# Patient Record
Sex: MALE | Race: WHITE | NOT HISPANIC OR LATINO | Employment: FULL TIME | URBAN - METROPOLITAN AREA
[De-identification: names, ages, dates, MRNs, and addresses within clinical notes are randomized per-mention and may not be internally consistent; named-entity substitution may affect disease eponyms.]

---

## 2019-07-16 ENCOUNTER — TELEPHONE (OUTPATIENT)
Dept: ORTHOPEDICS | Facility: CLINIC | Age: 59
End: 2019-07-16

## 2019-08-08 DIAGNOSIS — M79.642 PAIN OF LEFT HAND: Primary | ICD-10-CM

## 2019-08-16 ENCOUNTER — HOSPITAL ENCOUNTER (OUTPATIENT)
Dept: RADIOLOGY | Facility: HOSPITAL | Age: 59
Discharge: HOME | End: 2019-08-16
Attending: ORTHOPAEDIC SURGERY
Payer: COMMERCIAL

## 2019-08-16 ENCOUNTER — OFFICE VISIT (OUTPATIENT)
Dept: ORTHOPEDICS | Facility: CLINIC | Age: 59
End: 2019-08-16
Payer: COMMERCIAL

## 2019-08-16 VITALS — HEIGHT: 69 IN | BODY MASS INDEX: 28.14 KG/M2 | WEIGHT: 190 LBS

## 2019-08-16 DIAGNOSIS — M79.642 PAIN OF LEFT HAND: ICD-10-CM

## 2019-08-16 DIAGNOSIS — L72.0 EPIDERMOID CYST OF FINGER: Primary | ICD-10-CM

## 2019-08-16 DIAGNOSIS — L72.0 EPIDERMOID CYST OF FINGER OF LEFT HAND: Primary | ICD-10-CM

## 2019-08-16 PROCEDURE — 73130 XR HAND COMPLETE 3 VIEW LEFT: ICD-10-PCS | Mod: 26,LT,, | Performed by: RADIOLOGY

## 2019-08-16 PROCEDURE — 99203 OFFICE O/P NEW LOW 30 MIN: CPT | Mod: S$PBB,,, | Performed by: ORTHOPAEDIC SURGERY

## 2019-08-16 PROCEDURE — 73130 X-RAY EXAM OF HAND: CPT | Mod: 26,LT,, | Performed by: RADIOLOGY

## 2019-08-16 PROCEDURE — 73130 X-RAY EXAM OF HAND: CPT | Mod: TC,LT

## 2019-08-16 PROCEDURE — 99203 PR OFFICE/OUTPT VISIT, NEW, LEVL III, 30-44 MIN: ICD-10-PCS | Mod: S$PBB,,, | Performed by: ORTHOPAEDIC SURGERY

## 2019-08-16 PROCEDURE — 99212 OFFICE O/P EST SF 10 MIN: CPT | Mod: PBBFAC,25 | Performed by: ORTHOPAEDIC SURGERY

## 2019-08-16 PROCEDURE — 99999 PR PBB SHADOW E&M-EST. PATIENT-LVL II: CPT | Mod: PBBFAC,,, | Performed by: ORTHOPAEDIC SURGERY

## 2019-08-16 PROCEDURE — 99999 PR PBB SHADOW E&M-EST. PATIENT-LVL II: ICD-10-PCS | Mod: PBBFAC,,, | Performed by: ORTHOPAEDIC SURGERY

## 2019-08-16 RX ORDER — MULTIVITAMIN
1 TABLET ORAL DAILY
COMMUNITY

## 2019-08-16 NOTE — H&P (VIEW-ONLY)
Subjective:     Patient ID: Jabari Byrd is a 58 y.o. male.    Chief Complaint: Pain of the Left Hand    This 43-year-old male with a several month history of fall on left index finger soft tissue mass increased in size about the radial aspect of the PIP joint. He wishes to have it removed.      History reviewed. No pertinent past medical history.  Past Surgical History:   Procedure Laterality Date    CORRECTION OF HAMMER TOE      DENTAL SURGERY      EYE SURGERY      SINUS SURGERY       Family History   Problem Relation Age of Onset    Diabetes Mother      Social History     Socioeconomic History    Marital status:      Spouse name: Not on file    Number of children: Not on file    Years of education: Not on file    Highest education level: Not on file   Occupational History    Not on file   Social Needs    Financial resource strain: Not on file    Food insecurity:     Worry: Not on file     Inability: Not on file    Transportation needs:     Medical: Not on file     Non-medical: Not on file   Tobacco Use    Smoking status: Never Smoker   Substance and Sexual Activity    Alcohol use: Not on file    Drug use: Not on file    Sexual activity: Not on file   Lifestyle    Physical activity:     Days per week: Not on file     Minutes per session: Not on file    Stress: Not on file   Relationships    Social connections:     Talks on phone: Not on file     Gets together: Not on file     Attends Taoism service: Not on file     Active member of club or organization: Not on file     Attends meetings of clubs or organizations: Not on file     Relationship status: Not on file   Other Topics Concern    Not on file   Social History Narrative    Not on file     Medication List with Changes/Refills   Current Medications    MULTIVITAMIN (ONE DAILY MULTIVITAMIN) PER TABLET    Take 1 tablet by mouth once daily.     Review of patient's allergies indicates:   Allergen Reactions    Penicillins  Swelling     Review of Systems   Constitution: Negative for chills and decreased appetite.   HENT: Positive for tinnitus. Negative for hearing loss.    Eyes: Negative for double vision and visual disturbance.   Cardiovascular: Negative for chest pain.   Endocrine: Negative for cold intolerance.   Hematologic/Lymphatic: Does not bruise/bleed easily.   Skin: Negative for poor wound healing and suspicious lesions.   Musculoskeletal: Negative for gout, joint pain and joint swelling.   Gastrointestinal: Negative for nausea and vomiting.   Genitourinary: Negative for dysuria.   Neurological: Negative for numbness, paresthesias, seizures and sensory change.   Psychiatric/Behavioral: Negative for depression, memory loss, substance abuse, suicidal ideas and thoughts of violence.   Allergic/Immunologic: Negative for HIV exposure and persistent infections.       Objective:   Body mass index is 28.06 kg/m².  There were no vitals filed for this visit.             General    Constitutional: He is oriented to person, place, and time. He appears well-developed and well-nourished. No distress.   HENT:   Head: Normocephalic.   Mouth/Throat: Oropharynx is clear and moist.   Eyes: EOM are normal. Pupils are equal, round, and reactive to light.   Neck: Normal range of motion.   Cardiovascular: Normal rate, regular rhythm and normal heart sounds.  Exam reveals no gallop.    Pulmonary/Chest: Breath sounds normal.   Abdominal: Soft. He exhibits no mass. There is no tenderness.   Neurological: He is alert and oriented to person, place, and time. No cranial nerve deficit.   Psychiatric: He has a normal mood and affect.         Left Hand/Wrist Exam     Swelling   Hand - The patient is swollen on the index IP.    Other     Sensory Exam  Median Distribution: normal  Ulnar Distribution: normal  Radial Distribution: normal    Comments:  The patient is a 5 mm soft tissue mass radial aspect PIP joint left index finger.  There is a full range of  motion. It appears to be an epidermal inclusion cyst.          Vascular Exam       Capillary Refill  Left Hand: normal capillary refill      Relevant imaging results reviewed and interpreted by me, discussed with the patient and / or family today radiographs left index finger were normal.  MRI scan left index finger confirmed epidermal inclusion cyst  Assessment:     Encounter Diagnosis   Name Primary?    Epidermoid cyst of finger of left hand Yes        Plan:         The patient wishes to have this mass excised. He was counseled regarding that surgery. Risk complications and alternatives were discussed including the risk of infection, anesthetic risk, injury to nerves and vessels, loss of motion possible recurrence were discussed. He seems to understand and agree that surgery. All questions were answered.            Disclaimer: This note was prepared using a voice recognition system and is likely to have sound alike errors within the text.

## 2019-08-16 NOTE — PROGRESS NOTES
Preop instructions reviewed and printed copy given to pt. C-Hex sponges given. PAT appt made 8-21-19 and postop appt made 9-4-19. Pt YAMEL

## 2019-08-16 NOTE — PROGRESS NOTES
Subjective:     Patient ID: Jabari Byrd is a 58 y.o. male.    Chief Complaint: Pain of the Left Hand    This 43-year-old male with a several month history of fall on left index finger soft tissue mass increased in size about the radial aspect of the PIP joint. He wishes to have it removed.      History reviewed. No pertinent past medical history.  Past Surgical History:   Procedure Laterality Date    CORRECTION OF HAMMER TOE      DENTAL SURGERY      EYE SURGERY      SINUS SURGERY       Family History   Problem Relation Age of Onset    Diabetes Mother      Social History     Socioeconomic History    Marital status:      Spouse name: Not on file    Number of children: Not on file    Years of education: Not on file    Highest education level: Not on file   Occupational History    Not on file   Social Needs    Financial resource strain: Not on file    Food insecurity:     Worry: Not on file     Inability: Not on file    Transportation needs:     Medical: Not on file     Non-medical: Not on file   Tobacco Use    Smoking status: Never Smoker   Substance and Sexual Activity    Alcohol use: Not on file    Drug use: Not on file    Sexual activity: Not on file   Lifestyle    Physical activity:     Days per week: Not on file     Minutes per session: Not on file    Stress: Not on file   Relationships    Social connections:     Talks on phone: Not on file     Gets together: Not on file     Attends Mosque service: Not on file     Active member of club or organization: Not on file     Attends meetings of clubs or organizations: Not on file     Relationship status: Not on file   Other Topics Concern    Not on file   Social History Narrative    Not on file     Medication List with Changes/Refills   Current Medications    MULTIVITAMIN (ONE DAILY MULTIVITAMIN) PER TABLET    Take 1 tablet by mouth once daily.     Review of patient's allergies indicates:   Allergen Reactions    Penicillins  Swelling     Review of Systems   Constitution: Negative for chills and decreased appetite.   HENT: Positive for tinnitus. Negative for hearing loss.    Eyes: Negative for double vision and visual disturbance.   Cardiovascular: Negative for chest pain.   Endocrine: Negative for cold intolerance.   Hematologic/Lymphatic: Does not bruise/bleed easily.   Skin: Negative for poor wound healing and suspicious lesions.   Musculoskeletal: Negative for gout, joint pain and joint swelling.   Gastrointestinal: Negative for nausea and vomiting.   Genitourinary: Negative for dysuria.   Neurological: Negative for numbness, paresthesias, seizures and sensory change.   Psychiatric/Behavioral: Negative for depression, memory loss, substance abuse, suicidal ideas and thoughts of violence.   Allergic/Immunologic: Negative for HIV exposure and persistent infections.       Objective:   Body mass index is 28.06 kg/m².  There were no vitals filed for this visit.             General    Constitutional: He is oriented to person, place, and time. He appears well-developed and well-nourished. No distress.   HENT:   Head: Normocephalic.   Mouth/Throat: Oropharynx is clear and moist.   Eyes: EOM are normal. Pupils are equal, round, and reactive to light.   Neck: Normal range of motion.   Cardiovascular: Normal rate, regular rhythm and normal heart sounds.  Exam reveals no gallop.    Pulmonary/Chest: Breath sounds normal.   Abdominal: Soft. He exhibits no mass. There is no tenderness.   Neurological: He is alert and oriented to person, place, and time. No cranial nerve deficit.   Psychiatric: He has a normal mood and affect.         Left Hand/Wrist Exam     Swelling   Hand - The patient is swollen on the index IP.    Other     Sensory Exam  Median Distribution: normal  Ulnar Distribution: normal  Radial Distribution: normal    Comments:  The patient is a 5 mm soft tissue mass radial aspect PIP joint left index finger.  There is a full range of  motion. It appears to be an epidermal inclusion cyst.          Vascular Exam       Capillary Refill  Left Hand: normal capillary refill      Relevant imaging results reviewed and interpreted by me, discussed with the patient and / or family today radiographs left index finger were normal.  MRI scan left index finger confirmed epidermal inclusion cyst  Assessment:     Encounter Diagnosis   Name Primary?    Epidermoid cyst of finger of left hand Yes        Plan:         The patient wishes to have this mass excised. He was counseled regarding that surgery. Risk complications and alternatives were discussed including the risk of infection, anesthetic risk, injury to nerves and vessels, loss of motion possible recurrence were discussed. He seems to understand and agree that surgery. All questions were answered.            Disclaimer: This note was prepared using a voice recognition system and is likely to have sound alike errors within the text.

## 2019-08-23 ENCOUNTER — HOSPITAL ENCOUNTER (OUTPATIENT)
Dept: PREADMISSION TESTING | Facility: HOSPITAL | Age: 59
Discharge: HOME | End: 2019-08-23
Attending: ORTHOPAEDIC SURGERY
Payer: COMMERCIAL

## 2019-08-23 ENCOUNTER — TELEPHONE (OUTPATIENT)
Dept: ORTHOPEDICS | Facility: CLINIC | Age: 59
End: 2019-08-23

## 2019-08-23 VITALS
DIASTOLIC BLOOD PRESSURE: 87 MMHG | SYSTOLIC BLOOD PRESSURE: 145 MMHG | TEMPERATURE: 99 F | RESPIRATION RATE: 18 BRPM | HEART RATE: 77 BPM

## 2019-08-23 VITALS
TEMPERATURE: 99 F | DIASTOLIC BLOOD PRESSURE: 87 MMHG | HEART RATE: 77 BPM | RESPIRATION RATE: 18 BRPM | SYSTOLIC BLOOD PRESSURE: 145 MMHG

## 2019-08-23 DIAGNOSIS — L72.0 EPIDERMOID CYST OF FINGER OF LEFT HAND: ICD-10-CM

## 2019-08-23 DIAGNOSIS — Z78.9 DAILY CONSUMPTION OF ALCOHOL: ICD-10-CM

## 2019-08-23 DIAGNOSIS — Z01.818 PREOP TESTING: Primary | ICD-10-CM

## 2019-08-23 PROCEDURE — 93005 ELECTROCARDIOGRAM TRACING: CPT

## 2019-08-23 PROCEDURE — 93010 EKG 12-LEAD: ICD-10-PCS | Mod: ,,, | Performed by: INTERNAL MEDICINE

## 2019-08-23 PROCEDURE — 93010 ELECTROCARDIOGRAM REPORT: CPT | Mod: ,,, | Performed by: INTERNAL MEDICINE

## 2019-08-23 NOTE — DISCHARGE INSTRUCTIONS
To confirm, Your doctor has instructed you that surgery is scheduled for 08/29/2019.       Please report to [facility].  Pre admit office will call afternoon prior to surgery with final arrival time    INSTRUCTIONS IMPORTANT!!!   Do not eat, drink, or smoke after 12 midnight-including water. OK to brush teeth, no gum, candy or mints!    ¨ Take only these medicines with a small swallow of water-morning of surgery.  N/A    Pre operative instructions:  Please review the Pre-Operative Instruction booklet that you were given.        Bathing Instructions--See page 6 in the Pre-operative booklet.      Prevention of surgical site infections:     -Keep incisions clean and dry.   -Do not soak/submerge incisions in water until completely healed.   -Do not apply lotions, powders, creams, or deodorants to site.   -Always make sure hands are cleaned with antibacterial soap/ alcohol-based   prior to touching the surgical site.  (This includes doctors, nurses, staff, and yourself.)    Signs and symptoms:   -Redness and pain around the area where you had surgery   -Drainage of cloudy fluid from your surgical wound   -Fever over 100.4       I have read or had read and explained to me, and understand the above information.  Additional comments or instructions:  Received a copy of Pre-operative instructions booklet, FAQ surgical site infection sheet, and packets of hibiclens (if indicated).

## 2019-08-23 NOTE — H&P
Preoperative History and Physical                                                             Hospital Medicine      Chief Complaint: Preoperative evaluation     History of Present Illness:      Jabari Byrd is a 58 y.o. male who presents to the office today for a preoperative consultation at the request of Dr. Chin  who plans on performing excision of cyst to finger of left hand on August 29.     Functional Status:      The patient is able to climb a flight of stairs. The patient is able to ambulate several miles without difficulty. The patient's functional status is not affected by the surgical problem. The patient's functional status is not affected by shortness of breath, chest pain, dyspnea on exertion and fatigue.    MET score greater than 4    Past Medical History:      Reviewed PMH- pt denies any past medical history     Past Surgical History:      Past Surgical History:   Procedure Laterality Date    CORRECTION OF HAMMER TOE  2012    dental implant      REFRACTIVE SURGERY      SINUS SURGERY          Social History:      Social History     Socioeconomic History    Marital status:      Spouse name: Not on file    Number of children: Not on file    Years of education: Not on file    Highest education level: Not on file   Occupational History    Not on file   Social Needs    Financial resource strain: Not on file    Food insecurity:     Worry: Not on file     Inability: Not on file    Transportation needs:     Medical: Not on file     Non-medical: Not on file   Tobacco Use    Smoking status: Former Smoker     Years: 20.00   Substance and Sexual Activity    Alcohol use: Yes     Frequency: 4 or more times a week     Drinks per session: 5 or 6    Drug use: Not on file    Sexual activity: Not on file   Lifestyle    Physical activity:     Days per week: Not on file     Minutes per session: Not on file    Stress: Not on file    Relationships    Social connections:     Talks on phone: Not on file     Gets together: Not on file     Attends Hoahaoism service: Not on file     Active member of club or organization: Not on file     Attends meetings of clubs or organizations: Not on file     Relationship status: Not on file   Other Topics Concern    Not on file   Social History Narrative    Not on file        Family History:      Family History   Problem Relation Age of Onset    Diabetes Mother        Allergies:      Review of patient's allergies indicates:   Allergen Reactions    Penicillins Swelling       Medications:      Current Outpatient Medications   Medication Sig    multivitamin (ONE DAILY MULTIVITAMIN) per tablet Take 1 tablet by mouth once daily.     No current facility-administered medications for this encounter.        Vitals:      Vitals:    08/23/19 1426   BP: (!) 145/87   Pulse: 77   Resp: 18   Temp: 98.7 °F (37.1 °C)       Review of Systems:        Constitutional: Negative for fever, chills, weight loss, malaise/fatigue and diaphoresis.   HENT: Negative for hearing loss, ear pain, nosebleeds, congestion, sore throat, neck pain, tinnitus and ear discharge.    Eyes: Negative for blurred vision, double vision, photophobia, pain, discharge and redness.   Respiratory: Negative for cough, hemoptysis, sputum production, shortness of breath, wheezing and stridor.    Cardiovascular: Negative for chest pain, palpitations, orthopnea, claudication, leg swelling and PND.   Gastrointestinal: Negative for heartburn, nausea, vomiting, abdominal pain, diarrhea, constipation, blood in stool and melena.   Genitourinary: Negative for dysuria, urgency, frequency, hematuria and flank pain.   Musculoskeletal: +pain to left finger Negative for myalgias, back pain, joint pain and falls.   Skin: Negative for itching and rash.   Neurological: Negative for dizziness, tingling, tremors, sensory change, speech change, focal weakness, seizures, loss of  consciousness, weakness and headaches.   Endo/Heme/Allergies: Negative for environmental allergies and polydipsia. Does not bruise/bleed easily.   Psychiatric/Behavioral: Negative for depression, suicidal ideas, hallucinations, memory loss and substance abuse. The patient is not nervous/anxious and does not have insomnia.    All 14 systems reviewed and negative except as noted above.    Physical Exam:      Constitutional: Appears well-developed, well-nourished and in no acute distress.  Patient is oriented to person, place, and time.   Head: Normocephalic and atraumatic. Mucous membranes moist.  Neck: Neck supple no mass.   Cardiovascular: Normal rate and regular rhythm.  S1 S2 appreciated by ascultation.  Pulmonary/Chest: Effort normal and clear to auscultation bilaterally. No respiratory distress.   Abdomen: Soft. Non-tender and non-distended. Bowel sounds are normal.   Neurological: Patient is alert and oriented to person, place and time. Moves all extremities.  Skin: Warm and dry. No lesions.  Extremities: + mass to PIP of left index finger No clubbing, cyanosis or edema.    Laboratory data:      Reviewed and noted in plan where applicable. Please see chart for full laboratory data.    No results for input(s): CPK, CPKMB, TROPONINI, MB in the last 24 hours. No results for input(s): POCTGLUCOSE in the last 24 hours.     No results found for: INR, PROTIME    No results found for: WBC, HGB, HCT, MCV, PLT    No results for input(s): GLU, NA, K, CL, CO2, BUN, CREATININE, CALCIUM, MG in the last 24 hours.    Predictors of intubation difficulty:       Morbid obesity? no   Anatomically abnormal facies? no   Prominent incisors? no   Receding mandible? no   Short, thick neck? no   Neck range of motion: normal   Dentition: dental implants front bottom     Cardiographics:      EC19  normal sinus rhythm, no blocks or conduction defects, no ischemic changes-occasional PAC    Echocardiogram: not done    Imaging:       Chest x-ray: none     Assessment and Plan:      Epidermoid cyst of finger of left hand  The pt has a epidermoid cyst of finger of left hand and will have a cyst excision per Dr. Chin     Known risk factors for perioperative complications: None    Difficulty with intubation is not anticipated.    Cardiac Risk Estimation: low intraoperative cardiac risk     1.) Preoperative workup as follows: ECG.  2.) Change in medication regimen before surgery: none, continue medication regimen including morning of surgery, with sip of water.  3.) Prophylaxis for cardiac events with perioperative beta-blockers: not indicated.  4.) Invasive hemodynamic monitoring perioperatively: not indicated.  5.) Deep vein thrombosis prophylaxis postoperatively: regimen to be chosen by surgical team.  6.) Surveillance for postoperative MI with ECG immediately postoperatively and on postoperati ve days 1 and 2 AND troponin levels 24 hours postoperatively and on day 4 or hospital discharge (whichever comes first): not indicated.  7.) Current medications which may produce withdrawal symptoms if withheld perioperatively: none   8.) Other measures: Preoperative alcohol abstention x 30 days.    Daily consumption of alcohol  Pt counseled on alcohol cessation prior to surgery

## 2019-08-23 NOTE — ASSESSMENT & PLAN NOTE
The pt has a epidermoid cyst of finger of left hand and will have a cyst excision per Dr. hCin     Known risk factors for perioperative complications: None    Difficulty with intubation is not anticipated.    Cardiac Risk Estimation: low intraoperative cardiac risk     1.) Preoperative workup as follows: ECG.  2.) Change in medication regimen before surgery: none, continue medication regimen including morning of surgery, with sip of water.  3.) Prophylaxis for cardiac events with perioperative beta-blockers: not indicated.  4.) Invasive hemodynamic monitoring perioperatively: not indicated.  5.) Deep vein thrombosis prophylaxis postoperatively: regimen to be chosen by surgical team.  6.) Surveillance for postoperative MI with ECG immediately postoperatively and on postoperati ve days 1 and 2 AND troponin levels 24 hours postoperatively and on day 4 or hospital discharge (whichever comes first): not indicated.  7.) Current medications which may produce withdrawal symptoms if withheld perioperatively: none   8.) Other measures: Preoperative alcohol abstention x 30 days.

## 2019-08-23 NOTE — TELEPHONE ENCOUNTER
----- Message from Penny Lamb sent at 8/23/2019 11:04 AM CDT -----  Contact: Melodie with [facility]  Type: Needs Medical Advice    Who Called:  Melodie with [facility]  Symptoms (please be specific):  ma  How long has patient had these symptoms:  brodie  Pharmacy name and phone #:  [facility]  Best Call Back Number: [phone] - direct number to Melodie  Additional Information: calling to verify tax ID number. Please call to advise, thank you!

## 2019-08-28 ENCOUNTER — ANESTHESIA EVENT (OUTPATIENT)
Dept: SURGERY | Facility: HOSPITAL | Age: 59
End: 2019-08-28
Payer: COMMERCIAL

## 2019-08-28 NOTE — ANESTHESIA PREPROCEDURE EVALUATION
08/28/2019  Jabari Byrd is a 58 y.o., male.    Anesthesia Evaluation    I have reviewed the Patient Summary Reports.    I have reviewed the Nursing Notes.   I have reviewed the Medications.     Review of Systems  Anesthesia Hx:   Denies Personal Hx of Anesthesia complications.   Social:  Alcohol Use, Former Smoker    Cardiovascular:   ECG has been reviewed.        Physical Exam  General:  Well nourished    Airway/Jaw/Neck:  Airway Findings: Mouth Opening: Small, but > 3cm Tongue: Normal  General Airway Assessment: Adult, Average  Mallampati: III      Dental:  Dental Findings: In tact, Periodontal disease, Mild        Mental Status:  Mental Status Findings:  Cooperative, Alert and Oriented         Anesthesia Plan  Type of Anesthesia, risks & benefits discussed:  Anesthesia Type:  regional, MAC  Patient's Preference:   Intra-op Monitoring Plan: standard ASA monitors  Intra-op Monitoring Plan Comments:   Post Op Pain Control Plan: peripheral nerve block  Post Op Pain Control Plan Comments:   Induction:   IV  Beta Blocker:  Patient is not currently on a Beta-Blocker (No further documentation required).       Informed Consent: Patient understands risks and agrees with Anesthesia plan.  Questions answered. Anesthesia consent signed with patient.  ASA Score: 2     Day of Surgery Review of History & Physical:    H&P update referred to the surgeon.         Ready For Surgery From Anesthesia Perspective.

## 2019-08-29 ENCOUNTER — HOSPITAL ENCOUNTER (OUTPATIENT)
Facility: HOSPITAL | Age: 59
Discharge: HOME | End: 2019-08-29
Attending: ORTHOPAEDIC SURGERY | Admitting: ORTHOPAEDIC SURGERY
Payer: COMMERCIAL

## 2019-08-29 ENCOUNTER — ANESTHESIA (OUTPATIENT)
Dept: SURGERY | Facility: HOSPITAL | Age: 59
End: 2019-08-29
Payer: COMMERCIAL

## 2019-08-29 DIAGNOSIS — R22.32 MASS OF FINGER OF LEFT HAND: ICD-10-CM

## 2019-08-29 DIAGNOSIS — L72.0 EPIDERMOID CYST OF FINGER OF LEFT HAND: Primary | ICD-10-CM

## 2019-08-29 PROCEDURE — S0020 INJECTION, BUPIVICAINE HYDRO: HCPCS | Performed by: ANESTHESIOLOGY

## 2019-08-29 PROCEDURE — 11420 PR EXC SKIN BENIG <0.5 CM REMAINDER BODY: ICD-10-PCS | Mod: ,,, | Performed by: ORTHOPAEDIC SURGERY

## 2019-08-29 PROCEDURE — 71000015 HC POSTOP RECOV 1ST HR: Performed by: ORTHOPAEDIC SURGERY

## 2019-08-29 PROCEDURE — 63600175 PHARM REV CODE 636 W HCPCS: Performed by: ANESTHESIOLOGY

## 2019-08-29 PROCEDURE — 63600175 PHARM REV CODE 636 W HCPCS: Performed by: NURSE ANESTHETIST, CERTIFIED REGISTERED

## 2019-08-29 PROCEDURE — D9220A PRA ANESTHESIA: Mod: ANES,,, | Performed by: ANESTHESIOLOGY

## 2019-08-29 PROCEDURE — 76942 ECHO GUIDE FOR BIOPSY: CPT | Performed by: ANESTHESIOLOGY

## 2019-08-29 PROCEDURE — 37000009 HC ANESTHESIA EA ADD 15 MINS: Performed by: ORTHOPAEDIC SURGERY

## 2019-08-29 PROCEDURE — 71000033 HC RECOVERY, INTIAL HOUR: Performed by: ORTHOPAEDIC SURGERY

## 2019-08-29 PROCEDURE — S0077 INJECTION, CLINDAMYCIN PHOSP: HCPCS | Performed by: ANESTHESIOLOGY

## 2019-08-29 PROCEDURE — 37000008 HC ANESTHESIA 1ST 15 MINUTES: Performed by: ORTHOPAEDIC SURGERY

## 2019-08-29 PROCEDURE — 36000707: Performed by: ORTHOPAEDIC SURGERY

## 2019-08-29 PROCEDURE — 36000706: Performed by: ORTHOPAEDIC SURGERY

## 2019-08-29 PROCEDURE — 88304 TISSUE SPECIMEN TO PATHOLOGY - SURGERY: ICD-10-PCS | Mod: 26,,, | Performed by: PATHOLOGY

## 2019-08-29 PROCEDURE — D9220A PRA ANESTHESIA: ICD-10-PCS | Mod: CRNA,,, | Performed by: NURSE ANESTHETIST, CERTIFIED REGISTERED

## 2019-08-29 PROCEDURE — 25000003 PHARM REV CODE 250: Performed by: ORTHOPAEDIC SURGERY

## 2019-08-29 PROCEDURE — 11420 EXC H-F-NK-SP B9+MARG 0.5/<: CPT | Mod: ,,, | Performed by: ORTHOPAEDIC SURGERY

## 2019-08-29 PROCEDURE — D9220A PRA ANESTHESIA: ICD-10-PCS | Mod: ANES,,, | Performed by: ANESTHESIOLOGY

## 2019-08-29 PROCEDURE — 64450 NJX AA&/STRD OTHER PN/BRANCH: CPT | Performed by: ORTHOPAEDIC SURGERY

## 2019-08-29 PROCEDURE — 25000003 PHARM REV CODE 250: Performed by: ANESTHESIOLOGY

## 2019-08-29 PROCEDURE — 88304 TISSUE EXAM BY PATHOLOGIST: CPT | Mod: 26,,, | Performed by: PATHOLOGY

## 2019-08-29 PROCEDURE — 88304 TISSUE EXAM BY PATHOLOGIST: CPT | Performed by: PATHOLOGY

## 2019-08-29 PROCEDURE — D9220A PRA ANESTHESIA: Mod: CRNA,,, | Performed by: NURSE ANESTHETIST, CERTIFIED REGISTERED

## 2019-08-29 RX ORDER — LIDOCAINE HYDROCHLORIDE 20 MG/ML
INJECTION, SOLUTION EPIDURAL; INFILTRATION; INTRACAUDAL; PERINEURAL
Status: COMPLETED | OUTPATIENT
Start: 2019-08-29 | End: 2019-08-29

## 2019-08-29 RX ORDER — SODIUM CHLORIDE, SODIUM LACTATE, POTASSIUM CHLORIDE, CALCIUM CHLORIDE 600; 310; 30; 20 MG/100ML; MG/100ML; MG/100ML; MG/100ML
INJECTION, SOLUTION INTRAVENOUS CONTINUOUS
Status: ACTIVE | OUTPATIENT
Start: 2019-08-29 | End: 2019-08-30

## 2019-08-29 RX ORDER — HYDROCODONE BITARTRATE AND ACETAMINOPHEN 5; 325 MG/1; MG/1
1 TABLET ORAL EVERY 6 HOURS PRN
Status: DISCONTINUED | OUTPATIENT
Start: 2019-08-29 | End: 2019-08-29

## 2019-08-29 RX ORDER — IBUPROFEN 600 MG/1
600 TABLET ORAL ONCE
Status: COMPLETED | OUTPATIENT
Start: 2019-08-29 | End: 2019-08-29

## 2019-08-29 RX ORDER — ONDANSETRON 2 MG/ML
INJECTION INTRAMUSCULAR; INTRAVENOUS
Status: DISCONTINUED | OUTPATIENT
Start: 2019-08-29 | End: 2019-08-29

## 2019-08-29 RX ORDER — CHLORHEXIDINE GLUCONATE ORAL RINSE 1.2 MG/ML
10 SOLUTION DENTAL
Status: DISCONTINUED | OUTPATIENT
Start: 2019-08-29 | End: 2019-08-29 | Stop reason: HOSPADM

## 2019-08-29 RX ORDER — SODIUM CHLORIDE 9 MG/ML
INJECTION, SOLUTION INTRAVENOUS CONTINUOUS
Status: DISCONTINUED | OUTPATIENT
Start: 2019-08-29 | End: 2019-08-29 | Stop reason: HOSPADM

## 2019-08-29 RX ORDER — CLINDAMYCIN PHOSPHATE 900 MG/50ML
900 INJECTION, SOLUTION INTRAVENOUS ONCE
Status: COMPLETED | OUTPATIENT
Start: 2019-08-29 | End: 2019-08-29

## 2019-08-29 RX ORDER — ALPRAZOLAM 0.5 MG/1
0.5 TABLET ORAL ONCE AS NEEDED
Status: DISCONTINUED | OUTPATIENT
Start: 2019-08-29 | End: 2019-08-29

## 2019-08-29 RX ORDER — HYDROCODONE BITARTRATE AND ACETAMINOPHEN 5; 325 MG/1; MG/1
1 TABLET ORAL EVERY 4 HOURS PRN
Status: DISCONTINUED | OUTPATIENT
Start: 2019-08-29 | End: 2019-08-29

## 2019-08-29 RX ORDER — CEFAZOLIN SODIUM 2 G/50ML
2 SOLUTION INTRAVENOUS
Status: CANCELLED | OUTPATIENT
Start: 2019-08-29

## 2019-08-29 RX ORDER — SODIUM CHLORIDE, SODIUM LACTATE, POTASSIUM CHLORIDE, CALCIUM CHLORIDE 600; 310; 30; 20 MG/100ML; MG/100ML; MG/100ML; MG/100ML
INJECTION, SOLUTION INTRAVENOUS CONTINUOUS
Status: DISCONTINUED | OUTPATIENT
Start: 2019-08-29 | End: 2019-08-29 | Stop reason: HOSPADM

## 2019-08-29 RX ORDER — CHLORHEXIDINE GLUCONATE ORAL RINSE 1.2 MG/ML
10 SOLUTION DENTAL 2 TIMES DAILY
Status: DISCONTINUED | OUTPATIENT
Start: 2019-08-29 | End: 2019-08-29 | Stop reason: HOSPADM

## 2019-08-29 RX ORDER — HYDROCODONE BITARTRATE AND ACETAMINOPHEN 5; 325 MG/1; MG/1
1 TABLET ORAL EVERY 4 HOURS PRN
Status: DISCONTINUED | OUTPATIENT
Start: 2019-08-29 | End: 2019-08-29 | Stop reason: HOSPADM

## 2019-08-29 RX ORDER — LIDOCAINE HYDROCHLORIDE 20 MG/ML
INJECTION, SOLUTION INFILTRATION; PERINEURAL
Status: DISCONTINUED
Start: 2019-08-29 | End: 2019-08-29 | Stop reason: HOSPADM

## 2019-08-29 RX ORDER — LIDOCAINE HYDROCHLORIDE 10 MG/ML
0.5 INJECTION, SOLUTION EPIDURAL; INFILTRATION; INTRACAUDAL; PERINEURAL ONCE
Status: ACTIVE | OUTPATIENT
Start: 2019-08-29

## 2019-08-29 RX ORDER — BUPIVACAINE HYDROCHLORIDE 2.5 MG/ML
INJECTION, SOLUTION EPIDURAL; INFILTRATION; INTRACAUDAL
Status: DISCONTINUED
Start: 2019-08-29 | End: 2019-08-29 | Stop reason: WASHOUT

## 2019-08-29 RX ORDER — HYDROCODONE BITARTRATE AND ACETAMINOPHEN 5; 325 MG/1; MG/1
1 TABLET ORAL EVERY 6 HOURS PRN
Qty: 15 TABLET | Refills: 0 | Status: SHIPPED | OUTPATIENT
Start: 2019-08-29 | End: 2019-09-13

## 2019-08-29 RX ORDER — LIDOCAINE HYDROCHLORIDE 20 MG/ML
INJECTION, SOLUTION INFILTRATION; PERINEURAL
Status: DISCONTINUED | OUTPATIENT
Start: 2019-08-29 | End: 2019-08-29 | Stop reason: HOSPADM

## 2019-08-29 RX ORDER — MIDAZOLAM HYDROCHLORIDE 1 MG/ML
INJECTION, SOLUTION INTRAMUSCULAR; INTRAVENOUS
Status: DISCONTINUED | OUTPATIENT
Start: 2019-08-29 | End: 2019-08-29

## 2019-08-29 RX ORDER — LIDOCAINE HCL/PF 100 MG/5ML
SYRINGE (ML) INTRAVENOUS
Status: DISCONTINUED | OUTPATIENT
Start: 2019-08-29 | End: 2019-08-29

## 2019-08-29 RX ORDER — CLINDAMYCIN PHOSPHATE 900 MG/50ML
INJECTION, SOLUTION INTRAVENOUS
Status: COMPLETED
Start: 2019-08-29 | End: 2019-08-29

## 2019-08-29 RX ORDER — BUPIVACAINE HYDROCHLORIDE 2.5 MG/ML
10 INJECTION, SOLUTION EPIDURAL; INFILTRATION; INTRACAUDAL ONCE
Status: DISCONTINUED | OUTPATIENT
Start: 2019-08-29 | End: 2019-08-29 | Stop reason: HOSPADM

## 2019-08-29 RX ORDER — PROPOFOL 10 MG/ML
INJECTION, EMULSION INTRAVENOUS CONTINUOUS PRN
Status: DISCONTINUED | OUTPATIENT
Start: 2019-08-29 | End: 2019-08-29

## 2019-08-29 RX ORDER — CHLORHEXIDINE GLUCONATE ORAL RINSE 1.2 MG/ML
10 SOLUTION DENTAL 2 TIMES DAILY
Status: DISCONTINUED | OUTPATIENT
Start: 2019-08-29 | End: 2019-08-29 | Stop reason: SDUPTHER

## 2019-08-29 RX ORDER — BUPIVACAINE HYDROCHLORIDE 2.5 MG/ML
INJECTION, SOLUTION INFILTRATION; PERINEURAL
Status: COMPLETED | OUTPATIENT
Start: 2019-08-29 | End: 2019-08-29

## 2019-08-29 RX ORDER — LIDOCAINE HYDROCHLORIDE 20 MG/ML
5 INJECTION, SOLUTION EPIDURAL; INFILTRATION; INTRACAUDAL; PERINEURAL ONCE
Status: DISCONTINUED | OUTPATIENT
Start: 2019-08-29 | End: 2019-08-29 | Stop reason: HOSPADM

## 2019-08-29 RX ORDER — PROPOFOL 10 MG/ML
INJECTION, EMULSION INTRAVENOUS
Status: DISCONTINUED | OUTPATIENT
Start: 2019-08-29 | End: 2019-08-29

## 2019-08-29 RX ADMIN — IBUPROFEN 600 MG: 600 TABLET ORAL at 07:08

## 2019-08-29 RX ADMIN — PROPOFOL 50 MCG/KG/MIN: 10 INJECTION, EMULSION INTRAVENOUS at 07:08

## 2019-08-29 RX ADMIN — ONDANSETRON 4 MG: 2 INJECTION, SOLUTION INTRAMUSCULAR; INTRAVENOUS at 08:08

## 2019-08-29 RX ADMIN — PROPOFOL 100 MG: 10 INJECTION, EMULSION INTRAVENOUS at 07:08

## 2019-08-29 RX ADMIN — MIDAZOLAM 2 MG: 1 INJECTION INTRAMUSCULAR; INTRAVENOUS at 07:08

## 2019-08-29 RX ADMIN — LIDOCAINE HYDROCHLORIDE 5 ML: 20 INJECTION, SOLUTION EPIDURAL; INFILTRATION; INTRACAUDAL; PERINEURAL at 07:08

## 2019-08-29 RX ADMIN — CLINDAMYCIN PHOSPHATE 900 MG: 18 INJECTION, SOLUTION INTRAVENOUS at 07:08

## 2019-08-29 RX ADMIN — SODIUM CHLORIDE, SODIUM LACTATE, POTASSIUM CHLORIDE, AND CALCIUM CHLORIDE: 600; 310; 30; 20 INJECTION, SOLUTION INTRAVENOUS at 07:08

## 2019-08-29 RX ADMIN — LIDOCAINE HYDROCHLORIDE 100 MG: 20 INJECTION, SOLUTION INTRAVENOUS at 07:08

## 2019-08-29 RX ADMIN — BUPIVACAINE HYDROCHLORIDE 5 ML: 2.5 INJECTION, SOLUTION INFILTRATION; PERINEURAL at 07:08

## 2019-08-29 NOTE — TRANSFER OF CARE
"Anesthesia Transfer of Care Note    Patient: Jabari Byrd    Procedure(s) Performed: Procedure(s) (LRB):  EXCISION, CYST (Left)    Patient location: PACU    Anesthesia Type: MAC and regional    Transport from OR: Transported from OR on room air with adequate spontaneous ventilation    Post pain: adequate analgesia    Post assessment: no apparent anesthetic complications and tolerated procedure well    Post vital signs: stable    Level of consciousness: awake and alert    Nausea/Vomiting: no nausea/vomiting    Complications: none    Transfer of care protocol was followed      Last vitals:   Visit Vitals  BP (!) 157/90   Pulse 67   Temp 37.1 °C (98.8 °F) (Oral)   Resp 16   Ht 5' 9" (1.753 m)   Wt 93.8 kg (206 lb 12.7 oz)   SpO2 98%   BMI 30.54 kg/m²     "

## 2019-08-29 NOTE — ANESTHESIA PROCEDURE NOTES
Peripheral Block    Patient location during procedure: holding area    Reason for block: primary anesthetic   Diagnosis: left index finger cyst   Start time: 8/29/2019 7:29 AM  Timeout: 8/29/2019 7:28 AM   End time: 8/29/2019 7:33 AM    Staffing  Authorizing Provider: Hayden Quintero MD  Performing Provider: Hayden Quintero MD    Preanesthetic Checklist  Completed: patient identified, site marked, surgical consent, pre-op evaluation, timeout performed, IV checked, risks and benefits discussed and monitors and equipment checked  Peripheral Block  Patient position: sitting  Prep: ChloraPrep  Patient monitoring: continuous pulse ox, cardiac monitor, heart rate and frequent blood pressure checks  Block type: forearm - median and forearm - radial  Laterality: left  Injection technique: single shot  Needle  Needle type: Quincke   Needle gauge: 25 G  Needle length: 1.5 in  Needle localization: ultrasound guidance   -ultrasound image captured on disc.  Assessment  Injection assessment: negative aspiration, negative parasthesia and local visualized surrounding nerve  Heart rate change: no  Slow fractionated injection: yes

## 2019-08-29 NOTE — OP NOTE
[facility]  General Surgery  Operative Note    SUMMARY     Date of Procedure: 8/29/2019     Procedure: Procedure(s) (LRB):  EXCISION, CYST (Left)       Surgeon(s) and Role:     * Jabari Chin MD - Primary    Assisting Surgeon: None    Pre-Operative Diagnosis: Epidermoid cyst of finger [L72.0]    Post-Operative Diagnosis: Post-Op Diagnosis Codes:     * Epidermoid cyst of finger [L72.0]    Anesthesia:  Regional    Technical Procedures Used:  Excision epidermal cyst left index finger    Description of the Findings of the Procedure:  The patient was taken the operative room where regional block had been administered.  Satisfactory anesthesia had been achieved the left arm was prepped draped usual sterile fashion this was supplemented with 10 cc of 2% plain lidocaine at the base of the left index finger.  At this time a v-shaped incision was made over the mass volar radial PIP joint.  The incision was extended using blunt sharp dissections and 3.5 loupe magnification.  The skin was elevated the epidermal cyst was identified.  The radial neurovascular bundle was identified and carefully retracted.  The epidermal cyst was removed in submitted to pathology for examination.  Complete excision was performed.  Skin was closed using interrupted 4 nylon suture.  Xeroform gauze 4 by 4s and 2 ABD pads were over wrapped with 3 in gauze dressing.  The patient tolerated the procedure well and was transferred to the recovery room in satisfactory condition.    Significant Surgical Tasks Conducted by the Assistant(s), if Applicable:     Complications: No    Estimated Blood Loss (EBL): * No values recorded between 8/29/2019  8:05 AM and 8/29/2019  8:27 AM *           Implants: * No implants in log *    Specimens:   Specimen (12h ago, onward)    Start     Ordered    08/29/19 0890  Specimen to Pathology - Surgery  Once     Comments:  Pre-op Diagnosis: Epidermoid cyst of finger [L72.0]Procedure(s):EXCISION, CYST  Number of specimens: 1Name of specimens: EPIDERMAL CYST, FOR PERMANENT     Start Status     08/29/19 0811 Collected (08/29/19 0811) Order ID: [id number]       08/29/19 0811                  Condition: Good    Disposition: PACU - hemodynamically stable.    Attestation: I was present and scrubbed for the entire procedure.

## 2019-08-29 NOTE — INTERVAL H&P NOTE
The patient has been examined and the H&P has been reviewed:    I concur with the findings and no changes have occurred since H&P was written.    Anesthesia/Surgery risks, benefits and alternative options discussed and understood by patient/family.          Active Hospital Problems    Diagnosis  POA    *Epidermoid cyst of finger of left hand [L72.0]  Yes      Resolved Hospital Problems   No resolved problems to display.

## 2019-08-29 NOTE — PLAN OF CARE
Discussed current POC and discharge instructions with the patient and wife. No questions noted. Verbalized understanding and teach back.

## 2019-08-29 NOTE — DISCHARGE SUMMARY
[facility]  Short Stay  Discharge Summary    Admit Date: 8/29/2019    Discharge Date and Time:  08/29/2019 8:39 AM      Discharge Attending Physician: Jabari Chin MD     Hospital Course (synopsis of major diagnoses, care, treatment, and services provided during the course of the hospital stay):  The patient is a 58-year-old male with epidermal cyst left index finger radial volar PIP joint increasing in size and symptomatic admitted for excision.  The surgery was done and he was discharged with no complications.    Final Diagnoses:    Principal Problem: Epidermoid cyst of finger of left hand   Secondary Diagnoses:     Discharged Condition: good    Disposition: Home or Self Care    Follow up/Patient Instructions:    Medications:  Reconciled Home Medications:      Medication List      START taking these medications    HYDROcodone-acetaminophen 5-325 mg per tablet  Commonly known as:  NORCO  Take 1 tablet by mouth every 6 (six) hours as needed for Pain.        ASK your doctor about these medications    ONE DAILY MULTIVITAMIN per tablet  Generic drug:  multivitamin  Take 1 tablet by mouth once daily.          Discharge Procedure Orders   Diet general     Call MD for:  temperature >100.4     Call MD for:  persistent nausea and vomiting     Call MD for:  severe uncontrolled pain     Call MD for:  difficulty breathing, headache or visual disturbances     Call MD for:  redness, tenderness, or signs of infection (pain, swelling, redness, odor or green/yellow discharge around incision site)     Call MD for:  hives     Call MD for:  persistent dizziness or light-headedness     Call MD for:  extreme fatigue

## 2019-08-29 NOTE — ANESTHESIA POSTPROCEDURE EVALUATION
Anesthesia Post Evaluation    Patient: Jabari Byrd    Procedure(s) Performed: Procedure(s) (LRB):  EXCISION, CYST (Left)    Final Anesthesia Type: regional  Patient location during evaluation: PACU  Patient participation: Yes- Able to Participate  Level of consciousness: awake and alert and oriented  Post-procedure vital signs: reviewed and stable  Pain management: adequate  Airway patency: patent  PONV status at discharge: No PONV  Anesthetic complications: no      Cardiovascular status: hemodynamically stable  Respiratory status: unassisted, spontaneous ventilation and room air  Hydration status: euvolemic  Follow-up not needed.          Vitals Value Taken Time   /83 8/29/2019  8:44 AM   Temp 36.4 °C (97.6 °F) 8/29/2019  8:27 AM   Pulse 62 8/29/2019  8:44 AM   Resp 12 8/29/2019  8:44 AM   SpO2 97 % 8/29/2019  8:44 AM   Vitals shown include unvalidated device data.      No case tracking events are documented in the log.      Pain/Sowmya Score: Pain Rating Prior to Med Admin: 0 (8/29/2019  7:17 AM)

## 2019-08-30 VITALS
WEIGHT: 206.81 LBS | HEIGHT: 69 IN | BODY MASS INDEX: 30.63 KG/M2 | HEART RATE: 70 BPM | TEMPERATURE: 98 F | RESPIRATION RATE: 16 BRPM | SYSTOLIC BLOOD PRESSURE: 130 MMHG | OXYGEN SATURATION: 100 % | DIASTOLIC BLOOD PRESSURE: 80 MMHG

## 2019-09-04 ENCOUNTER — OFFICE VISIT (OUTPATIENT)
Dept: ORTHOPEDICS | Facility: CLINIC | Age: 59
End: 2019-09-04
Payer: COMMERCIAL

## 2019-09-04 VITALS
HEART RATE: 77 BPM | DIASTOLIC BLOOD PRESSURE: 79 MMHG | SYSTOLIC BLOOD PRESSURE: 142 MMHG | WEIGHT: 206 LBS | HEIGHT: 69 IN | BODY MASS INDEX: 30.51 KG/M2

## 2019-09-04 DIAGNOSIS — L72.0 EPIDERMOID CYST OF FINGER OF LEFT HAND: Primary | ICD-10-CM

## 2019-09-04 PROCEDURE — 99213 OFFICE O/P EST LOW 20 MIN: CPT | Mod: PBBFAC | Performed by: ORTHOPAEDIC SURGERY

## 2019-09-04 PROCEDURE — 99999 PR PBB SHADOW E&M-EST. PATIENT-LVL III: ICD-10-PCS | Mod: PBBFAC,,, | Performed by: ORTHOPAEDIC SURGERY

## 2019-09-04 PROCEDURE — 99024 PR POST-OP FOLLOW-UP VISIT: ICD-10-PCS | Mod: ,,, | Performed by: ORTHOPAEDIC SURGERY

## 2019-09-04 PROCEDURE — 99999 PR PBB SHADOW E&M-EST. PATIENT-LVL III: CPT | Mod: PBBFAC,,, | Performed by: ORTHOPAEDIC SURGERY

## 2019-09-04 PROCEDURE — 99024 POSTOP FOLLOW-UP VISIT: CPT | Mod: ,,, | Performed by: ORTHOPAEDIC SURGERY

## 2019-09-04 NOTE — PROGRESS NOTES
Subjective:     Patient ID: Jabari Byrd is a 58 y.o. male.    Chief Complaint: Post-op Evaluation and Pain of the Left Hand    The patient is a 58-year-old male seen in follow-up after left index finger epidermal cyst excision radial aspect PIP joint date of surgery was 08/29/2019.      Past Medical History:   Diagnosis Date    Sinusitis      Past Surgical History:   Procedure Laterality Date    CORRECTION OF HAMMER TOE  2012    dental implant      EXCISION, CYST Left 8/29/2019    [facility]    REFRACTIVE SURGERY      SINUS SURGERY       Family History   Problem Relation Age of Onset    Diabetes Mother      Social History     Socioeconomic History    Marital status:      Spouse name: Not on file    Number of children: Not on file    Years of education: Not on file    Highest education level: Not on file   Occupational History    Not on file   Social Needs    Financial resource strain: Not on file    Food insecurity:     Worry: Not on file     Inability: Not on file    Transportation needs:     Medical: Not on file     Non-medical: Not on file   Tobacco Use    Smoking status: Former Smoker     Years: 20.00   Substance and Sexual Activity    Alcohol use: Yes     Frequency: 4 or more times a week     Drinks per session: 5 or 6    Drug use: Not on file    Sexual activity: Not on file   Lifestyle    Physical activity:     Days per week: Not on file     Minutes per session: Not on file    Stress: Not on file   Relationships    Social connections:     Talks on phone: Not on file     Gets together: Not on file     Attends [employer] service: Not on file     Active member of club or organization: Not on file     Attends meetings of clubs or organizations: Not on file     Relationship status: Not on file   Other Topics Concern    Not on file   Social History Narrative    Not on file     Medication List with Changes/Refills   Current Medications     HYDROCODONE-ACETAMINOPHEN (NORCO) 5-325 MG PER TABLET    Take 1 tablet by mouth every 6 (six) hours as needed for Pain.    MULTIVITAMIN (ONE DAILY MULTIVITAMIN) PER TABLET    Take 1 tablet by mouth once daily.     Review of patient's allergies indicates:   Allergen Reactions    Penicillins Swelling    Wasp sting [allergen ext-venom-honey bee]      Review of Systems   Constitution: Negative for malaise/fatigue.   HENT: Negative for hearing loss.    Eyes: Negative for double vision and visual disturbance.   Cardiovascular: Negative for chest pain.   Respiratory: Negative for shortness of breath.    Endocrine: Negative for cold intolerance.   Hematologic/Lymphatic: Does not bruise/bleed easily.   Skin: Negative for poor wound healing and suspicious lesions.   Musculoskeletal: Negative for gout, joint pain and joint swelling.   Gastrointestinal: Negative for nausea and vomiting.   Genitourinary: Negative for dysuria.   Neurological: Negative for numbness, paresthesias and sensory change.   Psychiatric/Behavioral: Negative for depression, memory loss and substance abuse. The patient is not nervous/anxious.    Allergic/Immunologic: Negative for persistent infections.       Objective:   Body mass index is 30.42 kg/m².  Vitals:    09/04/19 1624   BP: (!) 142/79   Pulse: 77                General    Constitutional: He is oriented to person, place, and time. He appears well-developed and well-nourished. No distress.   HENT:   Head: Normocephalic.   Eyes: EOM are normal. Pupils are equal, round, and reactive to light.   Neck: Normal range of motion.   Pulmonary/Chest: Effort normal.   Neurological: He is oriented to person, place, and time.   Psychiatric: He has a normal mood and affect.         Left Hand/Wrist Exam     Inspection   Scars: Hand -  present    Swelling   Hand - The patient is swollen on the index IP.    Other     Sensory Exam  Median Distribution: normal  Ulnar Distribution: normal  Radial Distribution:  normal    Comments:  Patient's suture line on the radial aspect PIP joint left index finger is intact.  There is no sign of infection.  There are no motor or sensory deficits.          Vascular Exam       Capillary Refill  Left Hand: normal capillary refill      Relevant imaging results reviewed and interpreted by me, discussed with the patient and / or family today radiographs were not obtained today.  Assessment:     Encounter Diagnosis   Name Primary?    Epidermoid cyst of finger of left hand Yes        Plan:     The patient had a Band-Aid applied. Wound care was discussed.  He will return in 1 week for suture removal.                Disclaimer: This note was prepared using a voice recognition system and is likely to have sound alike errors within the text.

## 2019-09-11 ENCOUNTER — OFFICE VISIT (OUTPATIENT)
Dept: ORTHOPEDICS | Facility: CLINIC | Age: 59
End: 2019-09-11
Payer: COMMERCIAL

## 2019-09-11 VITALS
WEIGHT: 206 LBS | HEIGHT: 69 IN | DIASTOLIC BLOOD PRESSURE: 91 MMHG | HEART RATE: 67 BPM | SYSTOLIC BLOOD PRESSURE: 148 MMHG | BODY MASS INDEX: 30.51 KG/M2

## 2019-09-11 DIAGNOSIS — L72.0 EPIDERMOID CYST OF FINGER OF LEFT HAND: Primary | ICD-10-CM

## 2019-09-11 PROCEDURE — 99999 PR PBB SHADOW E&M-EST. PATIENT-LVL III: CPT | Mod: PBBFAC,,, | Performed by: ORTHOPAEDIC SURGERY

## 2019-09-11 PROCEDURE — 99213 OFFICE O/P EST LOW 20 MIN: CPT | Mod: PBBFAC | Performed by: ORTHOPAEDIC SURGERY

## 2019-09-11 PROCEDURE — 99024 POSTOP FOLLOW-UP VISIT: CPT | Mod: ,,, | Performed by: ORTHOPAEDIC SURGERY

## 2019-09-11 PROCEDURE — 99999 PR PBB SHADOW E&M-EST. PATIENT-LVL III: ICD-10-PCS | Mod: PBBFAC,,, | Performed by: ORTHOPAEDIC SURGERY

## 2019-09-11 PROCEDURE — 99024 PR POST-OP FOLLOW-UP VISIT: ICD-10-PCS | Mod: ,,, | Performed by: ORTHOPAEDIC SURGERY

## 2019-09-11 NOTE — PROGRESS NOTES
Subjective:     Patient ID: Jabari Byrd is a 58 y.o. male.    Chief Complaint: Pain and Post-op Evaluation of the Left Hand    The patient is a 58-year-old male seen in follow-up after a left index finger epidermal cyst excision radial aspect PIP joint date of surgery was 08/29/2019.      Past Medical History:   Diagnosis Date    Sinusitis      Past Surgical History:   Procedure Laterality Date    CORRECTION OF HAMMER TOE  2012    dental implant      EXCISION, CYST Left 8/29/2019    [facility]    REFRACTIVE SURGERY      SINUS SURGERY       Family History   Problem Relation Age of Onset    Diabetes Mother      Social History     Socioeconomic History    Marital status:      Spouse name: Not on file    Number of children: Not on file    Years of education: Not on file    Highest education level: Not on file   Occupational History    Not on file   Social Needs    Financial resource strain: Not on file    Food insecurity:     Worry: Not on file     Inability: Not on file    Transportation needs:     Medical: Not on file     Non-medical: Not on file   Tobacco Use    Smoking status: Former Smoker     Years: 20.00   Substance and Sexual Activity    Alcohol use: Yes     Frequency: 4 or more times a week     Drinks per session: 5 or 6    Drug use: Not on file    Sexual activity: Not on file   Lifestyle    Physical activity:     Days per week: Not on file     Minutes per session: Not on file    Stress: Not on file   Relationships    Social connections:     Talks on phone: Not on file     Gets together: Not on file     Attends Orthodox service: Not on file     Active member of club or organization: Not on file     Attends meetings of clubs or organizations: Not on file     Relationship status: Not on file   Other Topics Concern    Not on file   Social History Narrative    Not on file     Medication List with Changes/Refills   Current Medications     HYDROCODONE-ACETAMINOPHEN (NORCO) 5-325 MG PER TABLET    Take 1 tablet by mouth every 6 (six) hours as needed for Pain.    MULTIVITAMIN (ONE DAILY MULTIVITAMIN) PER TABLET    Take 1 tablet by mouth once daily.     Review of patient's allergies indicates:   Allergen Reactions    Penicillins Swelling    Wasp sting [allergen ext-venom-honey bee]      Review of Systems   Constitution: Negative for malaise/fatigue.   HENT: Negative for hearing loss.    Eyes: Negative for double vision and visual disturbance.   Cardiovascular: Negative for chest pain.   Respiratory: Negative for shortness of breath.    Endocrine: Negative for cold intolerance.   Hematologic/Lymphatic: Does not bruise/bleed easily.   Skin: Negative for poor wound healing and suspicious lesions.   Musculoskeletal: Negative for gout, joint pain and joint swelling.   Gastrointestinal: Negative for nausea and vomiting.   Genitourinary: Negative for dysuria.   Neurological: Negative for numbness, paresthesias and sensory change.   Psychiatric/Behavioral: Negative for depression, memory loss and substance abuse. The patient is not nervous/anxious.    Allergic/Immunologic: Negative for persistent infections.       Objective:   Body mass index is 30.42 kg/m².  Vitals:    09/11/19 1611   BP: (!) 148/91   Pulse: 67                General    Constitutional: He is oriented to person, place, and time. He appears well-developed and well-nourished. No distress.   HENT:   Head: Normocephalic.   Eyes: EOM are normal. Pupils are equal, round, and reactive to light.   Neck: Normal range of motion.   Pulmonary/Chest: Effort normal.   Neurological: He is oriented to person, place, and time.   Psychiatric: He has a normal mood and affect.         Left Hand/Wrist Exam     Inspection   Scars: Hand -  present    Pain   Hand - The patient exhibits pain of the index IP.    Other     Sensory Exam  Median Distribution: normal  Ulnar Distribution: normal  Radial Distribution:  normal    Comments:  The suture lines intact left index finger radial PIP joint.  There is no sign of infection.  There are no motor or sensory deficits.          Vascular Exam       Capillary Refill  Left Hand: normal capillary refill      Relevant imaging results reviewed and interpreted by me, discussed with the patient and / or family today no radiographs were obtained today.  Assessment:     Encounter Diagnosis   Name Primary?    Epidermoid cyst of finger of left hand Yes        Plan:       The patient's sutures removed today.  Steri-Strips were applied. Wound care was discussed. He seems to be doing well will return on a as needed basis.              Disclaimer: This note was prepared using a voice recognition system and is likely to have sound alike errors within the text.

## 2019-09-11 NOTE — LETTER
September 11, 2019      [facility]  [address] [address]           [facility]  [address] [place] [id number]  Phone: [phone]  Fax: [phone]          Patient: Jabari Byrd   MR Number: [id number]   YOB: 1960   Date of Visit: 9/11/2019       Dear [facility]:    Thank you for referring Jabari Byrd to me for evaluation. Attached you will find relevant portions of my assessment and plan of care.    If you have questions, please do not hesitate to call me. I look forward to following Jabari Byrd along with you.    Sincerely,    Jabari Chin MD    Enclosure  CC:  No Recipients    If you would like to receive this communication electronically, please contact [email or web address] or [phone] to request more information on [employer] access.    For providers and/or their staff who would like to refer a patient to [facility], please contact us through our one-stop-shop provider referral line, Damon Nelson, at [phone].    If you feel you have received this communication in error or would no longer like to receive these types of communications, please e-mail [email or web address]

## 2020-12-17 NOTE — TELEPHONE ENCOUNTER
Medicines for Acid Reflux  Your healthcare provider has told you that you have acid reflux. This condition causes stomach acid to wash up into your throat. For most people, acid reflux is troubling but not dangerous. But left untreated, acid reflux sometimes damages the esophagus. Medicines can help control acid reflux and limit your risk of future problems.  Medicines for acid reflux  Your healthcare provider may prescribe medicine to help treat your acid reflux. Medicine will be based on your symptoms and any test results. Your provider will explain how to take your medicine. You will also be told about possible side effects.  Reducing stomach acid  Your provider may suggest antacids that you can buy over the counter. Antacids can give fast relief. Or you may be told to take a type of medicine called H2 blockers. These are available over the counter and by prescription (for higher doses).  Blocking stomach acid  In more severe cases, your healthcare provider may suggest stronger medicines such as proton pump inhibitors (PPIs). These keep the stomach from making acid. They are often prescribed for long-term use.  Other medicines  In some cases medicines to reduce or block stomach acid may not work. Then you may be switched to another type of medicine that helps your stomach empty better.     Date Last Reviewed: 10/1/2016  © 7104-6795 [employer]. [address] [place] [address], [place] [address]. All rights reserved. This information is not intended as a substitute for professional medical care. Always follow your healthcare professional's instructions.        How Acid Reflux Affects Your Throat    Do you have to clear your throat or cough often? Are you hoarse? Do you have trouble swallowing? If you have these or other throat symptoms, you may have acid reflux. This occurs when stomach acid flows back up and irritates your throat.  Why you have throat symptoms  There are muscles (esophageal sphincters) at  Left message. Tax ID number for the [facility] is [id number]   both ends of the tube that carries food to your stomach (the esophagus). These muscles relax to let food pass. Then they tighten to keep stomach acid down. When the lower esophageal sphincter (LES) doesnt tighten enough, acid can flow back (reflux) from your stomach into your esophagus. This may cause heartburn. In some cases the upper esophageal sphincter (UES) also doesnt work well. Then acid can travel higher and enter your throat (pharynx). In many cases, this causes throat symptoms.  Common throat symptoms  · Need to clear your throat often  · Feeling like youre choking  · Long-term (chronic) cough  · Hoarseness  · Trouble swallowing  · Feel like you have a lump in your throat  · Sour or acid taste  · Sore throat that keeps coming back   Date Last Reviewed: 7/1/2016  © 1094-7612 [employer]. [address] [place] [address]. All rights reserved. This information is not intended as a substitute for professional medical care. Always follow your healthcare professional's instructions.        Tips to Control Acid Reflux    To control acid reflux, youll need to make some basic diet and lifestyle changes. The simple steps outlined below may be all youll need to ease discomfort.  Watch what you eat  · Avoid fatty foods and spicy foods.  · Eat fewer acidic foods, such as citrus and tomato-based foods. These can increase symptoms.  · Limit drinking alcohol, caffeine, and fizzy beverages. All increase acid reflux.  · Try limiting chocolate, peppermint, and spearmint. These can worsen acid reflux in some people.  Watch when you eat  · Avoid lying down for 3 hours after eating.  · Do not snack before going to bed.  Raise your head  Raising your head and upper body by 4 to 6 inches helps limit reflux when youre lying down. Put blocks under the head of your bed frame to raise it.  Other changes  · Lose weight, if you need to  · Dont exercise near bedtime  · Avoid tight-fitting clothes  · Limit  aspirin and ibuprofen  · Stop smoking   Date Last Reviewed: 7/1/2016  © 1481-4418 [employer]. [address] [place] [address], [place] [address]. All rights reserved. This information is not intended as a substitute for professional medical care. Always follow your healthcare professional's instructions.        GERD (Adult)    The esophagus is a tube that carries food from the mouth to the stomach. A valve at the lower end of the esophagus prevents stomach acid from flowing upward. When this valve doesn't work properly, stomach contents may repeatedly flow back up (reflux) into the esophagus. This is called gastroesophageal reflux disease (GERD). GERD can irritate the esophagus. It can cause problems with swallowing or breathing. In severe cases, GERD can cause recurrent pneumonia or other serious problems.  Symptoms of reflux include burning, pressure or sharp pain in the upper abdomen or mid to lower chest. The pain can spread to the neck, back, or shoulder. There may be belching, an acid taste in the back of the throat, chronic cough, or sore throat or hoarseness. GERD symptoms often occur during the day after a big meal. They can also occur at night when lying down.   Home care  Lifestyle changes can help reduce symptoms. If needed, medicines may be prescribed. Symptoms often improve with treatment, but if treatment is stopped, the symptoms often return after a few months. So most persons with GERD will need to continue treatment.  Lifestyle changes  · Limit or avoid fatty, fried, and spicy foods, as well as coffee, chocolate, mint, and foods with high acid content such as tomatoes and citrus fruit and juices (orange, grapefruit, lemon).  · Dont eat large meals, especially at night. Frequent, smaller meals are best. Do not lie down right after eating. And dont eat anything 3 hours before going to bed.  · Avoid drinking alcohol and smoking. As much as possible, stay away from second hand smoke.  · If you  "are overweight, losing weight will reduce symptoms.   · Avoid wearing tight clothing around your stomach area.  · If your symptoms occur during sleep, use a foam wedge to elevate your upper body (not just your head.) Or, place 4" blocks under the head of your bed.  Medicines  If needed, medicines can help relieve the symptoms of GERD and prevent damage to the esophagus. Discuss a medicine plan with your healthcare provider. This may include one or more of the following medicines:  · Antacids to help neutralize the normal acids in your stomach.  · Acid blockers (H2 blockers) to decrease acid production.  · Acid inhibitors (PPIs) to decrease acid production in a different way than the blockers. They may work better, but can take a little longer to take effect.  Take an antacid 30-60 minutes after eating and at bedtime, but not at the same time as an acid blocker.  Try not to take medicines such as ibuprofen and aspirin. If you are taking aspirin for your heart or other medical reasons, talk to your healthcare provider about stopping it.  Follow-up care  Follow up with your healthcare provider or as advised by our staff.  When to seek medical advice  Call your healthcare provider if any of the following occur:  · Stomach pain gets worse or moves to the lower right abdomen (appendix area)  · Chest pain appears or gets worse, or spreads to the back, neck, shoulder, or arm  · Frequent vomiting (cant keep down liquids)  · Blood in the stool or vomit (red or black in color)  · Feeling weak or dizzy  · Fever of 100.4ºF (38ºC) or higher, or as directed by your healthcare provider  Date Last Reviewed: 6/23/2015  © 4750-2823 [employer]. [address] [place] [address], [place] [address]. All rights reserved. This information is not intended as a substitute for professional medical care. Always follow your healthcare professional's instructions.        Lifestyle Changes for Controlling GERD  When you have GERD, stomach " acid feels as if its backing up toward your mouth. Whether or not you take medicine to control your GERD, your symptoms can often be improved with lifestyle changes. Talk to your healthcare provider about the following suggestions. These suggestions may help you get relief from your symptoms.      Raise your head  Reflux is more likely to strike when youre lying down flat, because stomach fluid can flow backward more easily. Raising the head of your bed 4 to 6 inches can help. To do this:  · Slide blocks or books under the legs at the head of your bed. Or, place a wedge under the mattress. Many [employer] can make a suitable wedge for you. The wedge should run from your waist to the top of your head.  · Dont just prop your head on several pillows. This increases pressure on your stomach. It can make GERD worse.  Watch your eating habits  Certain foods may increase the acid in your stomach or relax the lower esophageal sphincter. This makes GERD more likely. Its best to avoid the following if they cause you symptoms:  · Coffee, tea, and carbonated drinks (with and without caffeine)  · Fatty, fried, or spicy food  · Mint, chocolate, onions, and tomatoes  · Peppermint  · Any other foods that seem to irritate your stomach or cause you pain  Relieve the pressure  Tips include the following:  · Eat smaller meals, even if you have to eat more often.  · Dont lie down right after you eat. Wait a few hours for your stomach to empty.  · Avoid tight belts and tight-fitting clothes.  · Lose excess weight.  Tobacco and alcohol  Avoid smoking tobacco and drinking alcohol. They can make GERD symptoms worse.  Date Last Reviewed: 7/1/2016  © 5311-9706 [employer]. [address] [place] [address], [place] [address]. All rights reserved. This information is not intended as a substitute for professional medical care. Always follow your healthcare professional's instructions.

## 2021-04-29 ENCOUNTER — HOSPITAL ENCOUNTER (OUTPATIENT)
Dept: RADIOLOGY | Facility: HOSPITAL | Age: 61
Discharge: HOME | End: 2021-04-29
Attending: STUDENT IN AN ORGANIZED HEALTH CARE EDUCATION/TRAINING PROGRAM
Payer: OTHER GOVERNMENT

## 2021-04-29 DIAGNOSIS — M67.449 GANGLION CYST OF FINGER: ICD-10-CM

## 2021-04-29 PROCEDURE — 76882 US LMTD JT/FCL EVL NVASC XTR: CPT | Mod: TC,LT

## 2021-07-13 ENCOUNTER — HOSPITAL ENCOUNTER (OUTPATIENT)
Dept: CARDIOLOGY | Facility: HOSPITAL | Age: 61
Discharge: HOME | End: 2021-07-13
Attending: ORTHOPAEDIC SURGERY
Payer: OTHER GOVERNMENT

## 2021-07-13 ENCOUNTER — HOSPITAL ENCOUNTER (OUTPATIENT)
Dept: RADIOLOGY | Facility: HOSPITAL | Age: 61
Discharge: HOME | End: 2021-07-13
Attending: ORTHOPAEDIC SURGERY
Payer: OTHER GOVERNMENT

## 2021-07-13 ENCOUNTER — OFFICE VISIT (OUTPATIENT)
Dept: ORTHOPEDICS | Facility: CLINIC | Age: 61
End: 2021-07-13
Payer: OTHER GOVERNMENT

## 2021-07-13 VITALS
HEIGHT: 69 IN | WEIGHT: 191 LBS | HEART RATE: 59 BPM | SYSTOLIC BLOOD PRESSURE: 119 MMHG | DIASTOLIC BLOOD PRESSURE: 72 MMHG | BODY MASS INDEX: 28.29 KG/M2

## 2021-07-13 DIAGNOSIS — Z01.818 PREOP TESTING: Primary | ICD-10-CM

## 2021-07-13 DIAGNOSIS — Z01.818 PREOP TESTING: ICD-10-CM

## 2021-07-13 DIAGNOSIS — M65.30 TRIGGER FINGER, ACQUIRED: ICD-10-CM

## 2021-07-13 DIAGNOSIS — M67.432 GANGLION OF LEFT WRIST: Primary | ICD-10-CM

## 2021-07-13 PROCEDURE — 71046 XR CHEST PA AND LATERAL PRE-OP: ICD-10-PCS | Mod: 26,,, | Performed by: RADIOLOGY

## 2021-07-13 PROCEDURE — 99999 PR PBB SHADOW E&M-EST. PATIENT-LVL III: CPT | Mod: PBBFAC,,, | Performed by: ORTHOPAEDIC SURGERY

## 2021-07-13 PROCEDURE — 93005 ELECTROCARDIOGRAM TRACING: CPT

## 2021-07-13 PROCEDURE — 71046 X-RAY EXAM CHEST 2 VIEWS: CPT | Mod: 26,,, | Performed by: RADIOLOGY

## 2021-07-13 PROCEDURE — 99999 PR PBB SHADOW E&M-EST. PATIENT-LVL III: ICD-10-PCS | Mod: PBBFAC,,, | Performed by: ORTHOPAEDIC SURGERY

## 2021-07-13 PROCEDURE — 99213 OFFICE O/P EST LOW 20 MIN: CPT | Mod: PBBFAC | Performed by: ORTHOPAEDIC SURGERY

## 2021-07-13 PROCEDURE — 93010 EKG 12-LEAD: ICD-10-PCS | Mod: ,,, | Performed by: INTERNAL MEDICINE

## 2021-07-13 PROCEDURE — 99213 PR OFFICE/OUTPT VISIT, EST, LEVL III, 20-29 MIN: ICD-10-PCS | Mod: S$PBB,,, | Performed by: ORTHOPAEDIC SURGERY

## 2021-07-13 PROCEDURE — 71046 X-RAY EXAM CHEST 2 VIEWS: CPT | Mod: TC

## 2021-07-13 PROCEDURE — 99213 OFFICE O/P EST LOW 20 MIN: CPT | Mod: S$PBB,,, | Performed by: ORTHOPAEDIC SURGERY

## 2021-07-13 PROCEDURE — 93010 ELECTROCARDIOGRAM REPORT: CPT | Mod: ,,, | Performed by: INTERNAL MEDICINE

## 2021-07-14 ENCOUNTER — PATIENT MESSAGE (OUTPATIENT)
Dept: SURGERY | Facility: HOSPITAL | Age: 61
End: 2021-07-14

## 2021-07-14 ENCOUNTER — PATIENT MESSAGE (OUTPATIENT)
Dept: ADMINISTRATIVE | Facility: OTHER | Age: 61
End: 2021-07-14

## 2021-07-14 DIAGNOSIS — M67.432 GANGLION OF LEFT WRIST: Primary | ICD-10-CM

## 2021-07-14 DIAGNOSIS — M65.30 TRIGGER FINGER, ACQUIRED: ICD-10-CM

## 2021-07-20 ENCOUNTER — TELEPHONE (OUTPATIENT)
Dept: PREADMISSION TESTING | Facility: HOSPITAL | Age: 61
End: 2021-07-20

## 2021-07-20 ENCOUNTER — PATIENT MESSAGE (OUTPATIENT)
Dept: PREADMISSION TESTING | Facility: HOSPITAL | Age: 61
End: 2021-07-20

## 2021-07-28 ENCOUNTER — TELEPHONE (OUTPATIENT)
Dept: PREADMISSION TESTING | Facility: HOSPITAL | Age: 61
End: 2021-07-28

## 2021-07-29 ENCOUNTER — HOSPITAL ENCOUNTER (OUTPATIENT)
Facility: HOSPITAL | Age: 61
Discharge: HOME | End: 2021-07-29
Attending: ORTHOPAEDIC SURGERY | Admitting: ORTHOPAEDIC SURGERY
Payer: OTHER GOVERNMENT

## 2021-07-29 ENCOUNTER — ANESTHESIA EVENT (OUTPATIENT)
Dept: SURGERY | Facility: HOSPITAL | Age: 61
End: 2021-07-29
Payer: OTHER GOVERNMENT

## 2021-07-29 ENCOUNTER — ANESTHESIA (OUTPATIENT)
Dept: SURGERY | Facility: HOSPITAL | Age: 61
End: 2021-07-29
Payer: OTHER GOVERNMENT

## 2021-07-29 VITALS
TEMPERATURE: 98 F | HEIGHT: 69 IN | WEIGHT: 179.81 LBS | BODY MASS INDEX: 26.63 KG/M2 | SYSTOLIC BLOOD PRESSURE: 133 MMHG | OXYGEN SATURATION: 97 % | DIASTOLIC BLOOD PRESSURE: 83 MMHG | HEART RATE: 47 BPM | RESPIRATION RATE: 16 BRPM

## 2021-07-29 DIAGNOSIS — R22.32 MASS OF FINGER OF LEFT HAND: Primary | ICD-10-CM

## 2021-07-29 DIAGNOSIS — L72.0 EPIDERMOID CYST OF FINGER OF LEFT HAND: ICD-10-CM

## 2021-07-29 DIAGNOSIS — M67.432 GANGLION CYST OF DORSUM OF LEFT WRIST: ICD-10-CM

## 2021-07-29 PROCEDURE — 25000003 PHARM REV CODE 250: Performed by: PHYSICIAN ASSISTANT

## 2021-07-29 PROCEDURE — 01810 ANES PX NRV MUSC F/ARM WRST: CPT | Performed by: ORTHOPAEDIC SURGERY

## 2021-07-29 PROCEDURE — 37000009 HC ANESTHESIA EA ADD 15 MINS: Performed by: ORTHOPAEDIC SURGERY

## 2021-07-29 PROCEDURE — 71000033 HC RECOVERY, INTIAL HOUR: Performed by: ORTHOPAEDIC SURGERY

## 2021-07-29 PROCEDURE — 26160 PR EXCIS TENDON SHEATH LESION, HAND/FINGER: ICD-10-PCS | Mod: FA,,, | Performed by: ORTHOPAEDIC SURGERY

## 2021-07-29 PROCEDURE — 37000008 HC ANESTHESIA 1ST 15 MINUTES: Performed by: ORTHOPAEDIC SURGERY

## 2021-07-29 PROCEDURE — 88304 TISSUE EXAM BY PATHOLOGIST: CPT | Mod: 26,,, | Performed by: PATHOLOGY

## 2021-07-29 PROCEDURE — 88304 PR  SURG PATH,LEVEL III: ICD-10-PCS | Mod: 26,,, | Performed by: PATHOLOGY

## 2021-07-29 PROCEDURE — 63600175 PHARM REV CODE 636 W HCPCS: Performed by: NURSE ANESTHETIST, CERTIFIED REGISTERED

## 2021-07-29 PROCEDURE — 71000015 HC POSTOP RECOV 1ST HR: Performed by: ORTHOPAEDIC SURGERY

## 2021-07-29 PROCEDURE — 25000003 PHARM REV CODE 250: Performed by: NURSE ANESTHETIST, CERTIFIED REGISTERED

## 2021-07-29 PROCEDURE — 36000706: Performed by: ORTHOPAEDIC SURGERY

## 2021-07-29 PROCEDURE — D9220A PRA ANESTHESIA: Mod: ,,, | Performed by: STUDENT IN AN ORGANIZED HEALTH CARE EDUCATION/TRAINING PROGRAM

## 2021-07-29 PROCEDURE — D9220A PRA ANESTHESIA: ICD-10-PCS | Mod: ,,, | Performed by: STUDENT IN AN ORGANIZED HEALTH CARE EDUCATION/TRAINING PROGRAM

## 2021-07-29 PROCEDURE — 26160 REMOVE TENDON SHEATH LESION: CPT | Mod: FA,,, | Performed by: ORTHOPAEDIC SURGERY

## 2021-07-29 PROCEDURE — 25000003 PHARM REV CODE 250: Performed by: ORTHOPAEDIC SURGERY

## 2021-07-29 PROCEDURE — 88304 TISSUE EXAM BY PATHOLOGIST: CPT | Performed by: PATHOLOGY

## 2021-07-29 PROCEDURE — 63600175 PHARM REV CODE 636 W HCPCS: Performed by: PHYSICIAN ASSISTANT

## 2021-07-29 PROCEDURE — 36000707: Performed by: ORTHOPAEDIC SURGERY

## 2021-07-29 RX ORDER — CLINDAMYCIN PHOSPHATE 900 MG/50ML
INJECTION, SOLUTION INTRAVENOUS
Status: COMPLETED
Start: 2021-07-29 | End: 2021-07-29

## 2021-07-29 RX ORDER — CHLORHEXIDINE GLUCONATE ORAL RINSE 1.2 MG/ML
10 SOLUTION DENTAL 2 TIMES DAILY
Status: DISCONTINUED | OUTPATIENT
Start: 2021-07-29 | End: 2021-07-29 | Stop reason: HOSPADM

## 2021-07-29 RX ORDER — MIDAZOLAM HYDROCHLORIDE 1 MG/ML
INJECTION INTRAMUSCULAR; INTRAVENOUS
Status: DISCONTINUED | OUTPATIENT
Start: 2021-07-29 | End: 2021-07-29

## 2021-07-29 RX ORDER — CLINDAMYCIN PHOSPHATE 900 MG/50ML
900 INJECTION, SOLUTION INTRAVENOUS
Status: COMPLETED | OUTPATIENT
Start: 2021-07-29 | End: 2021-07-29

## 2021-07-29 RX ORDER — FENTANYL CITRATE 50 UG/ML
INJECTION, SOLUTION INTRAMUSCULAR; INTRAVENOUS
Status: DISCONTINUED | OUTPATIENT
Start: 2021-07-29 | End: 2021-07-29

## 2021-07-29 RX ORDER — SODIUM CHLORIDE 0.9 % (FLUSH) 0.9 %
10 SYRINGE (ML) INJECTION
Status: DISCONTINUED | OUTPATIENT
Start: 2021-07-29 | End: 2021-07-29 | Stop reason: HOSPADM

## 2021-07-29 RX ORDER — ONDANSETRON 2 MG/ML
4 INJECTION INTRAMUSCULAR; INTRAVENOUS DAILY PRN
Status: DISCONTINUED | OUTPATIENT
Start: 2021-07-29 | End: 2021-07-29 | Stop reason: HOSPADM

## 2021-07-29 RX ORDER — BUPIVACAINE HYDROCHLORIDE 2.5 MG/ML
INJECTION, SOLUTION EPIDURAL; INFILTRATION; INTRACAUDAL
Status: DISCONTINUED
Start: 2021-07-29 | End: 2021-07-29 | Stop reason: HOSPADM

## 2021-07-29 RX ORDER — LIDOCAINE HYDROCHLORIDE 20 MG/ML
INJECTION, SOLUTION EPIDURAL; INFILTRATION; INTRACAUDAL; PERINEURAL
Status: DISCONTINUED | OUTPATIENT
Start: 2021-07-29 | End: 2021-07-29

## 2021-07-29 RX ORDER — HYDROCODONE BITARTRATE AND ACETAMINOPHEN 5; 325 MG/1; MG/1
1 TABLET ORAL EVERY 4 HOURS PRN
Status: DISCONTINUED | OUTPATIENT
Start: 2021-07-29 | End: 2021-07-29 | Stop reason: HOSPADM

## 2021-07-29 RX ORDER — FENTANYL CITRATE 50 UG/ML
25 INJECTION, SOLUTION INTRAMUSCULAR; INTRAVENOUS EVERY 5 MIN PRN
Status: DISCONTINUED | OUTPATIENT
Start: 2021-07-29 | End: 2021-07-29 | Stop reason: HOSPADM

## 2021-07-29 RX ORDER — LIDOCAINE HYDROCHLORIDE 20 MG/ML
INJECTION, SOLUTION EPIDURAL; INFILTRATION; INTRACAUDAL; PERINEURAL
Status: DISCONTINUED | OUTPATIENT
Start: 2021-07-29 | End: 2021-07-29 | Stop reason: HOSPADM

## 2021-07-29 RX ORDER — PROPOFOL 10 MG/ML
VIAL (ML) INTRAVENOUS
Status: DISCONTINUED | OUTPATIENT
Start: 2021-07-29 | End: 2021-07-29

## 2021-07-29 RX ORDER — HYDROCODONE BITARTRATE AND ACETAMINOPHEN 5; 325 MG/1; MG/1
1 TABLET ORAL EVERY 6 HOURS PRN
Qty: 15 TABLET | Refills: 0 | Status: SHIPPED | OUTPATIENT
Start: 2021-07-29 | End: 2021-10-12

## 2021-07-29 RX ORDER — ONDANSETRON 2 MG/ML
INJECTION INTRAMUSCULAR; INTRAVENOUS
Status: DISCONTINUED | OUTPATIENT
Start: 2021-07-29 | End: 2021-07-29

## 2021-07-29 RX ORDER — SODIUM CHLORIDE, SODIUM LACTATE, POTASSIUM CHLORIDE, CALCIUM CHLORIDE 600; 310; 30; 20 MG/100ML; MG/100ML; MG/100ML; MG/100ML
INJECTION, SOLUTION INTRAVENOUS
Status: DISCONTINUED | OUTPATIENT
Start: 2021-07-29 | End: 2021-07-29 | Stop reason: HOSPADM

## 2021-07-29 RX ORDER — LIDOCAINE HYDROCHLORIDE 20 MG/ML
INJECTION, SOLUTION INFILTRATION; PERINEURAL
Status: DISCONTINUED
Start: 2021-07-29 | End: 2021-07-29 | Stop reason: HOSPADM

## 2021-07-29 RX ADMIN — PROPOFOL 100 MG: 10 INJECTION, EMULSION INTRAVENOUS at 08:07

## 2021-07-29 RX ADMIN — MIDAZOLAM HYDROCHLORIDE 2 MG: 1 INJECTION INTRAMUSCULAR; INTRAVENOUS at 08:07

## 2021-07-29 RX ADMIN — PROPOFOL 50 MG: 10 INJECTION, EMULSION INTRAVENOUS at 09:07

## 2021-07-29 RX ADMIN — FENTANYL CITRATE 50 MCG: 50 INJECTION, SOLUTION INTRAMUSCULAR; INTRAVENOUS at 08:07

## 2021-07-29 RX ADMIN — FENTANYL CITRATE 50 MCG: 50 INJECTION, SOLUTION INTRAMUSCULAR; INTRAVENOUS at 09:07

## 2021-07-29 RX ADMIN — LIDOCAINE HYDROCHLORIDE 100 MG: 20 INJECTION, SOLUTION EPIDURAL; INFILTRATION; INTRACAUDAL; PERINEURAL at 08:07

## 2021-07-29 RX ADMIN — PROPOFOL 30 MG: 10 INJECTION, EMULSION INTRAVENOUS at 08:07

## 2021-07-29 RX ADMIN — PROPOFOL 20 MG: 10 INJECTION, EMULSION INTRAVENOUS at 08:07

## 2021-07-29 RX ADMIN — ONDANSETRON 4 MG: 2 INJECTION, SOLUTION INTRAMUSCULAR; INTRAVENOUS at 09:07

## 2021-07-29 RX ADMIN — CLINDAMYCIN PHOSPHATE 900 MG: 18 INJECTION, SOLUTION INTRAVENOUS at 08:07

## 2021-07-29 RX ADMIN — SODIUM CHLORIDE, SODIUM LACTATE, POTASSIUM CHLORIDE, AND CALCIUM CHLORIDE: .6; .31; .03; .02 INJECTION, SOLUTION INTRAVENOUS at 06:07

## 2021-08-02 ENCOUNTER — OFFICE VISIT (OUTPATIENT)
Dept: ORTHOPEDICS | Facility: CLINIC | Age: 61
End: 2021-08-02
Payer: OTHER GOVERNMENT

## 2021-08-02 VITALS
BODY MASS INDEX: 26.51 KG/M2 | WEIGHT: 179 LBS | DIASTOLIC BLOOD PRESSURE: 75 MMHG | SYSTOLIC BLOOD PRESSURE: 133 MMHG | HEIGHT: 69 IN | HEART RATE: 52 BPM

## 2021-08-02 DIAGNOSIS — M67.432 GANGLION OF LEFT WRIST: Primary | ICD-10-CM

## 2021-08-02 DIAGNOSIS — M65.30 TRIGGER FINGER, ACQUIRED: ICD-10-CM

## 2021-08-02 LAB
FINAL PATHOLOGIC DIAGNOSIS: NORMAL
GROSS: NORMAL
Lab: NORMAL

## 2021-08-02 PROCEDURE — 99999 PR PBB SHADOW E&M-EST. PATIENT-LVL III: CPT | Mod: PBBFAC,,, | Performed by: PHYSICIAN ASSISTANT

## 2021-08-02 PROCEDURE — 99024 POSTOP FOLLOW-UP VISIT: CPT | Mod: ,,, | Performed by: PHYSICIAN ASSISTANT

## 2021-08-02 PROCEDURE — 99024 PR POST-OP FOLLOW-UP VISIT: ICD-10-PCS | Mod: ,,, | Performed by: PHYSICIAN ASSISTANT

## 2021-08-02 PROCEDURE — 99213 OFFICE O/P EST LOW 20 MIN: CPT | Mod: PBBFAC | Performed by: PHYSICIAN ASSISTANT

## 2021-08-02 PROCEDURE — 99999 PR PBB SHADOW E&M-EST. PATIENT-LVL III: ICD-10-PCS | Mod: PBBFAC,,, | Performed by: PHYSICIAN ASSISTANT

## 2021-08-12 ENCOUNTER — PATIENT MESSAGE (OUTPATIENT)
Dept: ADMINISTRATIVE | Facility: OTHER | Age: 61
End: 2021-08-12

## 2021-08-13 ENCOUNTER — OFFICE VISIT (OUTPATIENT)
Dept: ORTHOPEDICS | Facility: CLINIC | Age: 61
End: 2021-08-13
Payer: OTHER GOVERNMENT

## 2021-08-13 ENCOUNTER — PATIENT MESSAGE (OUTPATIENT)
Dept: ORTHOPEDICS | Facility: CLINIC | Age: 61
End: 2021-08-13

## 2021-08-13 VITALS
SYSTOLIC BLOOD PRESSURE: 125 MMHG | BODY MASS INDEX: 26.51 KG/M2 | HEIGHT: 69 IN | WEIGHT: 179 LBS | DIASTOLIC BLOOD PRESSURE: 74 MMHG | HEART RATE: 56 BPM

## 2021-08-13 DIAGNOSIS — M67.432 GANGLION OF LEFT WRIST: ICD-10-CM

## 2021-08-13 DIAGNOSIS — M65.30 TRIGGER FINGER, ACQUIRED: Primary | ICD-10-CM

## 2021-08-13 PROCEDURE — 99024 POSTOP FOLLOW-UP VISIT: CPT | Mod: ,,, | Performed by: PHYSICIAN ASSISTANT

## 2021-08-13 PROCEDURE — 99999 PR PBB SHADOW E&M-EST. PATIENT-LVL III: CPT | Mod: PBBFAC,,, | Performed by: PHYSICIAN ASSISTANT

## 2021-08-13 PROCEDURE — 99213 OFFICE O/P EST LOW 20 MIN: CPT | Mod: PBBFAC | Performed by: PHYSICIAN ASSISTANT

## 2021-08-13 PROCEDURE — 99999 PR PBB SHADOW E&M-EST. PATIENT-LVL III: ICD-10-PCS | Mod: PBBFAC,,, | Performed by: PHYSICIAN ASSISTANT

## 2021-08-13 PROCEDURE — 99024 PR POST-OP FOLLOW-UP VISIT: ICD-10-PCS | Mod: ,,, | Performed by: PHYSICIAN ASSISTANT

## 2021-09-07 ENCOUNTER — OFFICE VISIT (OUTPATIENT)
Dept: ORTHOPEDICS | Facility: CLINIC | Age: 61
End: 2021-09-07
Payer: OTHER GOVERNMENT

## 2021-09-07 DIAGNOSIS — M67.432 GANGLION CYST OF DORSUM OF LEFT WRIST: ICD-10-CM

## 2021-09-07 DIAGNOSIS — L72.0 EPIDERMOID CYST OF FINGER OF LEFT HAND: ICD-10-CM

## 2021-09-07 DIAGNOSIS — R22.32 MASS OF FINGER OF LEFT HAND: Primary | ICD-10-CM

## 2021-09-07 PROCEDURE — 99024 POSTOP FOLLOW-UP VISIT: CPT | Mod: ,,, | Performed by: PHYSICIAN ASSISTANT

## 2021-09-07 PROCEDURE — 99024 PR POST-OP FOLLOW-UP VISIT: ICD-10-PCS | Mod: ,,, | Performed by: PHYSICIAN ASSISTANT

## 2021-09-07 PROCEDURE — 99999 PR PBB SHADOW E&M-EST. PATIENT-LVL III: CPT | Mod: PBBFAC,,, | Performed by: PHYSICIAN ASSISTANT

## 2021-09-07 PROCEDURE — 99999 PR PBB SHADOW E&M-EST. PATIENT-LVL III: ICD-10-PCS | Mod: PBBFAC,,, | Performed by: PHYSICIAN ASSISTANT

## 2021-09-07 PROCEDURE — 99213 OFFICE O/P EST LOW 20 MIN: CPT | Mod: PBBFAC | Performed by: PHYSICIAN ASSISTANT

## 2021-09-17 ENCOUNTER — PATIENT MESSAGE (OUTPATIENT)
Dept: ORTHOPEDICS | Facility: CLINIC | Age: 61
End: 2021-09-17

## 2021-09-17 DIAGNOSIS — L72.0 EPIDERMOID CYST OF FINGER OF LEFT HAND: Primary | ICD-10-CM

## 2021-10-01 ENCOUNTER — PATIENT MESSAGE (OUTPATIENT)
Dept: ORTHOPEDICS | Facility: CLINIC | Age: 61
End: 2021-10-01

## 2021-10-07 ENCOUNTER — OFFICE VISIT (OUTPATIENT)
Dept: ORTHOPEDICS | Facility: CLINIC | Age: 61
End: 2021-10-07
Payer: OTHER GOVERNMENT

## 2021-10-07 VITALS
SYSTOLIC BLOOD PRESSURE: 142 MMHG | BODY MASS INDEX: 26.51 KG/M2 | HEIGHT: 69 IN | WEIGHT: 179 LBS | HEART RATE: 63 BPM | DIASTOLIC BLOOD PRESSURE: 77 MMHG

## 2021-10-07 DIAGNOSIS — L72.0 EPIDERMOID CYST OF FINGER OF LEFT HAND: Primary | ICD-10-CM

## 2021-10-07 PROCEDURE — 99024 PR POST-OP FOLLOW-UP VISIT: ICD-10-PCS | Mod: ,,, | Performed by: PHYSICIAN ASSISTANT

## 2021-10-07 PROCEDURE — 99999 PR PBB SHADOW E&M-EST. PATIENT-LVL III: ICD-10-PCS | Mod: PBBFAC,,, | Performed by: PHYSICIAN ASSISTANT

## 2021-10-07 PROCEDURE — 99024 POSTOP FOLLOW-UP VISIT: CPT | Mod: ,,, | Performed by: PHYSICIAN ASSISTANT

## 2021-10-07 PROCEDURE — 99213 OFFICE O/P EST LOW 20 MIN: CPT | Mod: PBBFAC | Performed by: PHYSICIAN ASSISTANT

## 2021-10-07 PROCEDURE — 99999 PR PBB SHADOW E&M-EST. PATIENT-LVL III: CPT | Mod: PBBFAC,,, | Performed by: PHYSICIAN ASSISTANT

## 2021-10-12 ENCOUNTER — OFFICE VISIT (OUTPATIENT)
Dept: ORTHOPEDICS | Facility: CLINIC | Age: 61
End: 2021-10-12
Payer: OTHER GOVERNMENT

## 2021-10-12 VITALS
BODY MASS INDEX: 26.51 KG/M2 | HEIGHT: 69 IN | SYSTOLIC BLOOD PRESSURE: 144 MMHG | DIASTOLIC BLOOD PRESSURE: 76 MMHG | WEIGHT: 179 LBS | HEART RATE: 59 BPM

## 2021-10-12 DIAGNOSIS — M65.30 TRIGGER FINGER, ACQUIRED: ICD-10-CM

## 2021-10-12 DIAGNOSIS — R22.32 MASS OF FINGER OF LEFT HAND: Primary | ICD-10-CM

## 2021-10-12 PROCEDURE — 99999 PR PBB SHADOW E&M-EST. PATIENT-LVL III: ICD-10-PCS | Mod: PBBFAC,,, | Performed by: ORTHOPAEDIC SURGERY

## 2021-10-12 PROCEDURE — 99999 PR PBB SHADOW E&M-EST. PATIENT-LVL III: CPT | Mod: PBBFAC,,, | Performed by: ORTHOPAEDIC SURGERY

## 2021-10-12 PROCEDURE — 99024 PR POST-OP FOLLOW-UP VISIT: ICD-10-PCS | Mod: ,,, | Performed by: ORTHOPAEDIC SURGERY

## 2021-10-12 PROCEDURE — 99024 POSTOP FOLLOW-UP VISIT: CPT | Mod: ,,, | Performed by: ORTHOPAEDIC SURGERY

## 2021-10-12 PROCEDURE — 99213 OFFICE O/P EST LOW 20 MIN: CPT | Mod: PBBFAC | Performed by: ORTHOPAEDIC SURGERY

## (undated) DEVICE — SYR 10CC LUER LOCK

## (undated) DEVICE — SUT VICRYL 4-0 18 P-3

## (undated) DEVICE — PAD CAST SPECIALIST STRL 3

## (undated) DEVICE — GLOVE BIOGEL SZ 8 1/2

## (undated) DEVICE — UNDERGLOVES BIOGEL PI SIZE 7.5

## (undated) DEVICE — SEE MEDLINE ITEM 157117

## (undated) DEVICE — NDL SAFETY 25G X 1.5 ECLIPSE

## (undated) DEVICE — UNDERGLOVES BIOGEL PI SIZE 8.5

## (undated) DEVICE — COVER LIGHT HANDLE 80/CA

## (undated) DEVICE — COVER CAMERA OPERATING ROOM

## (undated) DEVICE — SEE MEDLINE ITEM 157173

## (undated) DEVICE — DRAPE PLASTIC U 60X72

## (undated) DEVICE — DRESSING XEROFORM FOIL PK 1X8

## (undated) DEVICE — ALCOHOL 70% ISOP RUBBING 4OZ

## (undated) DEVICE — PAD CAST SPECIALIST STRL 4

## (undated) DEVICE — SEE MEDLINE ITEM 157027

## (undated) DEVICE — SEE MEDLINE ITEM 152528

## (undated) DEVICE — GLOVE SURGICAL LATEX SZ 7

## (undated) DEVICE — BANDAGE ELASTIC 3X5 VELCRO ST

## (undated) DEVICE — PAD ABD 8X10 STERILE

## (undated) DEVICE — SEE MEDLINE ITEM 157131

## (undated) DEVICE — TOURNIQUET SB QC DP 18X4IN

## (undated) DEVICE — GAUZE SPONGE 4X4 12PLY

## (undated) DEVICE — SUT 4-0 ETHILON 18 PS-2

## (undated) DEVICE — SUPPORT ULNA NERVE PROTECTOR

## (undated) DEVICE — BRUSH SCRUB STERILE W/O GERMIC

## (undated) DEVICE — APPLICATOR CHLORAPREP ORN 26ML

## (undated) DEVICE — SEE MEDLINE ITEM 152487

## (undated) DEVICE — SOL 9P NACL IRR PIC IL

## (undated) DEVICE — COVER OVERHEAD SURG LT BLUE

## (undated) DEVICE — POSITIONER HEAD DONUT 9IN FOAM

## (undated) DEVICE — BANDAGE SOFFORM STER 2IN

## (undated) DEVICE — SEE MEDLINE ITEM 152622

## (undated) DEVICE — SEE MEDLINE ITEM 152522

## (undated) DEVICE — SEE MEDLINE ITEM 146308

## (undated) DEVICE — SUT ETHILON 4-0 PS2 18 BLK

## (undated) DEVICE — SCRUB HIBICLENS 4% CHG 4OZ

## (undated) DEVICE — GOWN SURG 2XL DISP TIE BACK